# Patient Record
Sex: MALE | Race: WHITE | NOT HISPANIC OR LATINO | Employment: OTHER | URBAN - METROPOLITAN AREA
[De-identification: names, ages, dates, MRNs, and addresses within clinical notes are randomized per-mention and may not be internally consistent; named-entity substitution may affect disease eponyms.]

---

## 2021-04-15 ENCOUNTER — OFFICE VISIT (OUTPATIENT)
Dept: URGENT CARE | Facility: CLINIC | Age: 66
End: 2021-04-15
Payer: MEDICAID

## 2021-04-15 VITALS
BODY MASS INDEX: 28.75 KG/M2 | RESPIRATION RATE: 20 BRPM | DIASTOLIC BLOOD PRESSURE: 90 MMHG | HEIGHT: 74 IN | TEMPERATURE: 97 F | SYSTOLIC BLOOD PRESSURE: 150 MMHG | HEART RATE: 87 BPM | WEIGHT: 224 LBS | OXYGEN SATURATION: 95 %

## 2021-04-15 DIAGNOSIS — H61.23 BILATERAL IMPACTED CERUMEN: Primary | ICD-10-CM

## 2021-04-15 PROBLEM — A49.8: Status: ACTIVE | Noted: 2021-04-15

## 2021-04-15 PROBLEM — I50.22 CHRONIC SYSTOLIC HEART FAILURE: Status: ACTIVE | Noted: 2019-06-21

## 2021-04-15 PROBLEM — I21.4 NSTEMI (NON-ST ELEVATED MYOCARDIAL INFARCTION): Status: ACTIVE | Noted: 2019-05-05

## 2021-04-15 PROBLEM — I33.0 ACUTE BACTERIAL ENDOCARDITIS: Status: ACTIVE | Noted: 2021-04-15

## 2021-04-15 PROBLEM — R06.1 STRIDOR: Status: ACTIVE | Noted: 2017-08-03

## 2021-04-15 PROBLEM — R60.0 BILATERAL EDEMA OF LOWER EXTREMITY: Status: ACTIVE | Noted: 2019-05-05

## 2021-04-15 PROBLEM — D14.1: Status: ACTIVE | Noted: 2017-08-03

## 2021-04-15 PROBLEM — Z87.898 HX OF INTRAVENOUS DRUG USE IN REMISSION: Status: ACTIVE | Noted: 2021-04-15

## 2021-04-15 PROBLEM — B18.2 CHRONIC HEPATITIS C WITHOUT HEPATIC COMA: Status: ACTIVE | Noted: 2021-04-15

## 2021-04-15 PROBLEM — I34.0 SEVERE MITRAL REGURGITATION: Status: ACTIVE | Noted: 2019-05-23

## 2021-04-15 PROBLEM — F11.10 HEROIN ABUSE: Status: ACTIVE | Noted: 2017-08-10

## 2021-04-15 PROBLEM — L02.419 ABSCESS OF SHOULDER: Status: ACTIVE | Noted: 2019-05-05

## 2021-04-15 PROBLEM — J38.3 VOCAL CORD MASS: Status: ACTIVE | Noted: 2021-04-15

## 2021-04-15 PROBLEM — H35.711: Status: ACTIVE | Noted: 2019-11-05

## 2021-04-15 PROBLEM — I76 SEPTIC EMBOLISM: Status: ACTIVE | Noted: 2021-04-15

## 2021-04-15 PROBLEM — G06.0 BRAIN ABSCESS: Status: ACTIVE | Noted: 2021-04-15

## 2021-04-15 PROCEDURE — 99203 PR OFFICE/OUTPT VISIT, NEW, LEVL III, 30-44 MIN: ICD-10-PCS | Mod: S$GLB,,, | Performed by: PHYSICIAN ASSISTANT

## 2021-04-15 PROCEDURE — 99203 OFFICE O/P NEW LOW 30 MIN: CPT | Mod: S$GLB,,, | Performed by: PHYSICIAN ASSISTANT

## 2022-01-09 ENCOUNTER — HOSPITAL ENCOUNTER (OUTPATIENT)
Facility: HOSPITAL | Age: 67
Discharge: HOME | End: 2022-01-10
Attending: EMERGENCY MEDICINE | Admitting: INTERNAL MEDICINE
Payer: MEDICARE

## 2022-01-09 DIAGNOSIS — U07.1 COVID: ICD-10-CM

## 2022-01-09 DIAGNOSIS — R05.9 COUGH: ICD-10-CM

## 2022-01-09 DIAGNOSIS — U07.1 COVID-19: Primary | ICD-10-CM

## 2022-01-09 DIAGNOSIS — R07.9 CHEST PAIN: ICD-10-CM

## 2022-01-09 DIAGNOSIS — R06.02 SOB (SHORTNESS OF BREATH): ICD-10-CM

## 2022-01-09 LAB
ALBUMIN SERPL BCP-MCNC: 4.3 G/DL (ref 3.5–5.2)
ALP SERPL-CCNC: 62 U/L (ref 55–135)
ALT SERPL W/O P-5'-P-CCNC: 66 U/L (ref 10–44)
ANION GAP SERPL CALC-SCNC: 9 MMOL/L (ref 8–16)
APTT PPP: 34 SEC (ref 23.3–35.1)
AST SERPL-CCNC: 62 U/L (ref 10–40)
BASOPHILS # BLD AUTO: 0.02 K/UL (ref 0–0.2)
BASOPHILS NFR BLD: 0.5 % (ref 0–1.9)
BILIRUB SERPL-MCNC: 0.9 MG/DL (ref 0.1–1)
BNP SERPL-MCNC: 22 PG/ML (ref 0–99)
BUN SERPL-MCNC: 17 MG/DL (ref 8–23)
CALCIUM SERPL-MCNC: 8.6 MG/DL (ref 8.7–10.5)
CHLORIDE SERPL-SCNC: 96 MMOL/L (ref 95–110)
CK SERPL-CCNC: 245 U/L (ref 20–200)
CO2 SERPL-SCNC: 28 MMOL/L (ref 23–29)
CREAT SERPL-MCNC: 1.4 MG/DL (ref 0.5–1.4)
CRP SERPL-MCNC: 0.49 MG/DL
D DIMER PPP IA.FEU-MCNC: 0.72 UG/ML FEU
DIFFERENTIAL METHOD: ABNORMAL
EOSINOPHIL # BLD AUTO: 0 K/UL (ref 0–0.5)
EOSINOPHIL NFR BLD: 0 % (ref 0–8)
ERYTHROCYTE [DISTWIDTH] IN BLOOD BY AUTOMATED COUNT: 15.8 % (ref 11.5–14.5)
ERYTHROCYTE [SEDIMENTATION RATE] IN BLOOD BY WESTERGREN METHOD: 6 MM/HR (ref 0–10)
EST. GFR  (AFRICAN AMERICAN): >60 ML/MIN/1.73 M^2
EST. GFR  (NON AFRICAN AMERICAN): 52.4 ML/MIN/1.73 M^2
FERRITIN SERPL-MCNC: 164 NG/ML (ref 20–300)
GLUCOSE SERPL-MCNC: 96 MG/DL (ref 70–110)
HCT VFR BLD AUTO: 58.3 % (ref 40–54)
HGB BLD-MCNC: 18.4 G/DL (ref 14–18)
IMM GRANULOCYTES # BLD AUTO: 0.03 K/UL (ref 0–0.04)
IMM GRANULOCYTES NFR BLD AUTO: 0.7 % (ref 0–0.5)
INFLUENZA A, MOLECULAR: NEGATIVE
INFLUENZA B, MOLECULAR: NEGATIVE
INR PPP: 1.2
INR PPP: 1.3
LACTATE SERPL-SCNC: 1.6 MMOL/L (ref 0.5–1.9)
LDH SERPL L TO P-CCNC: 257 U/L (ref 110–260)
LYMPHOCYTES # BLD AUTO: 0.9 K/UL (ref 1–4.8)
LYMPHOCYTES NFR BLD: 21.2 % (ref 18–48)
MCH RBC QN AUTO: 26.7 PG (ref 27–31)
MCHC RBC AUTO-ENTMCNC: 31.6 G/DL (ref 32–36)
MCV RBC AUTO: 85 FL (ref 82–98)
MONOCYTES # BLD AUTO: 0.7 K/UL (ref 0.3–1)
MONOCYTES NFR BLD: 17.2 % (ref 4–15)
NEUTROPHILS # BLD AUTO: 2.5 K/UL (ref 1.8–7.7)
NEUTROPHILS NFR BLD: 60.4 % (ref 38–73)
NRBC BLD-RTO: 0 /100 WBC
PLATELET # BLD AUTO: 176 K/UL (ref 150–450)
PMV BLD AUTO: 10 FL (ref 9.2–12.9)
POTASSIUM SERPL-SCNC: 4 MMOL/L (ref 3.5–5.1)
PROT SERPL-MCNC: 8.5 G/DL (ref 6–8.4)
PROTHROMBIN TIME: 14 SEC (ref 11.4–13.7)
PROTHROMBIN TIME: 15.1 SEC (ref 11.4–13.7)
RBC # BLD AUTO: 6.88 M/UL (ref 4.6–6.2)
SARS-COV-2 RDRP RESP QL NAA+PROBE: POSITIVE
SODIUM SERPL-SCNC: 133 MMOL/L (ref 136–145)
SPECIMEN SOURCE: NORMAL
TROPONIN I SERPL DL<=0.01 NG/ML-MCNC: <0.03 NG/ML
WBC # BLD AUTO: 4.19 K/UL (ref 3.9–12.7)

## 2022-01-09 PROCEDURE — 25000003 PHARM REV CODE 250: Performed by: NURSE PRACTITIONER

## 2022-01-09 PROCEDURE — 36415 COLL VENOUS BLD VENIPUNCTURE: CPT | Performed by: EMERGENCY MEDICINE

## 2022-01-09 PROCEDURE — 93005 ELECTROCARDIOGRAM TRACING: CPT | Performed by: INTERNAL MEDICINE

## 2022-01-09 PROCEDURE — 82306 VITAMIN D 25 HYDROXY: CPT | Performed by: NURSE PRACTITIONER

## 2022-01-09 PROCEDURE — 63600175 PHARM REV CODE 636 W HCPCS: Performed by: NURSE PRACTITIONER

## 2022-01-09 PROCEDURE — 82728 ASSAY OF FERRITIN: CPT | Performed by: EMERGENCY MEDICINE

## 2022-01-09 PROCEDURE — 85651 RBC SED RATE NONAUTOMATED: CPT | Performed by: NURSE PRACTITIONER

## 2022-01-09 PROCEDURE — 83880 ASSAY OF NATRIURETIC PEPTIDE: CPT | Performed by: EMERGENCY MEDICINE

## 2022-01-09 PROCEDURE — U0002 COVID-19 LAB TEST NON-CDC: HCPCS | Performed by: NURSE PRACTITIONER

## 2022-01-09 PROCEDURE — 36415 COLL VENOUS BLD VENIPUNCTURE: CPT | Performed by: NURSE PRACTITIONER

## 2022-01-09 PROCEDURE — 93010 ELECTROCARDIOGRAM REPORT: CPT | Mod: ,,, | Performed by: INTERNAL MEDICINE

## 2022-01-09 PROCEDURE — 82550 ASSAY OF CK (CPK): CPT | Performed by: EMERGENCY MEDICINE

## 2022-01-09 PROCEDURE — 85379 FIBRIN DEGRADATION QUANT: CPT | Performed by: EMERGENCY MEDICINE

## 2022-01-09 PROCEDURE — 87502 INFLUENZA DNA AMP PROBE: CPT | Performed by: INTERNAL MEDICINE

## 2022-01-09 PROCEDURE — 93010 EKG 12-LEAD: ICD-10-PCS | Mod: ,,, | Performed by: INTERNAL MEDICINE

## 2022-01-09 PROCEDURE — 83605 ASSAY OF LACTIC ACID: CPT | Performed by: NURSE PRACTITIONER

## 2022-01-09 PROCEDURE — 84484 ASSAY OF TROPONIN QUANT: CPT | Performed by: NURSE PRACTITIONER

## 2022-01-09 PROCEDURE — 84484 ASSAY OF TROPONIN QUANT: CPT | Mod: 91 | Performed by: EMERGENCY MEDICINE

## 2022-01-09 PROCEDURE — G0378 HOSPITAL OBSERVATION PER HR: HCPCS

## 2022-01-09 PROCEDURE — 85610 PROTHROMBIN TIME: CPT | Performed by: NURSE PRACTITIONER

## 2022-01-09 PROCEDURE — 85025 COMPLETE CBC W/AUTO DIFF WBC: CPT | Performed by: EMERGENCY MEDICINE

## 2022-01-09 PROCEDURE — 96372 THER/PROPH/DIAG INJ SC/IM: CPT | Mod: 59

## 2022-01-09 PROCEDURE — 85610 PROTHROMBIN TIME: CPT | Mod: 91 | Performed by: EMERGENCY MEDICINE

## 2022-01-09 PROCEDURE — 85730 THROMBOPLASTIN TIME PARTIAL: CPT | Performed by: NURSE PRACTITIONER

## 2022-01-09 PROCEDURE — 80053 COMPREHEN METABOLIC PANEL: CPT | Performed by: EMERGENCY MEDICINE

## 2022-01-09 PROCEDURE — 87040 BLOOD CULTURE FOR BACTERIA: CPT | Mod: 59 | Performed by: NURSE PRACTITIONER

## 2022-01-09 PROCEDURE — 86140 C-REACTIVE PROTEIN: CPT | Performed by: EMERGENCY MEDICINE

## 2022-01-09 PROCEDURE — 25500020 PHARM REV CODE 255: Performed by: NURSE PRACTITIONER

## 2022-01-09 PROCEDURE — 99285 EMERGENCY DEPT VISIT HI MDM: CPT | Mod: 25

## 2022-01-09 PROCEDURE — 83615 LACTATE (LD) (LDH) ENZYME: CPT | Performed by: EMERGENCY MEDICINE

## 2022-01-09 PROCEDURE — 96360 HYDRATION IV INFUSION INIT: CPT

## 2022-01-09 PROCEDURE — 96361 HYDRATE IV INFUSION ADD-ON: CPT

## 2022-01-09 RX ORDER — DEXAMETHASONE SODIUM PHOSPHATE 4 MG/ML
8 INJECTION, SOLUTION INTRA-ARTICULAR; INTRALESIONAL; INTRAMUSCULAR; INTRAVENOUS; SOFT TISSUE
Status: COMPLETED | OUTPATIENT
Start: 2022-01-09 | End: 2022-01-09

## 2022-01-09 RX ORDER — ONDANSETRON 2 MG/ML
4 INJECTION INTRAMUSCULAR; INTRAVENOUS EVERY 8 HOURS PRN
Status: DISCONTINUED | OUTPATIENT
Start: 2022-01-09 | End: 2022-01-10 | Stop reason: HOSPADM

## 2022-01-09 RX ORDER — SODIUM CHLORIDE 0.9 % (FLUSH) 0.9 %
10 SYRINGE (ML) INJECTION
Status: DISCONTINUED | OUTPATIENT
Start: 2022-01-09 | End: 2022-01-10 | Stop reason: HOSPADM

## 2022-01-09 RX ORDER — IBUPROFEN 200 MG
24 TABLET ORAL
Status: DISCONTINUED | OUTPATIENT
Start: 2022-01-09 | End: 2022-01-10 | Stop reason: HOSPADM

## 2022-01-09 RX ORDER — ACETAMINOPHEN 325 MG/1
650 TABLET ORAL EVERY 8 HOURS PRN
Status: DISCONTINUED | OUTPATIENT
Start: 2022-01-09 | End: 2022-01-10 | Stop reason: HOSPADM

## 2022-01-09 RX ORDER — ACETAMINOPHEN 500 MG
1000 TABLET ORAL EVERY 6 HOURS PRN
COMMUNITY

## 2022-01-09 RX ORDER — ALBUTEROL SULFATE 90 UG/1
2 AEROSOL, METERED RESPIRATORY (INHALATION) EVERY 8 HOURS
Status: DISCONTINUED | OUTPATIENT
Start: 2022-01-10 | End: 2022-01-09

## 2022-01-09 RX ORDER — LANOLIN ALCOHOL/MO/W.PET/CERES
800 CREAM (GRAM) TOPICAL
Status: DISCONTINUED | OUTPATIENT
Start: 2022-01-09 | End: 2022-01-10 | Stop reason: HOSPADM

## 2022-01-09 RX ORDER — ENOXAPARIN SODIUM 100 MG/ML
1 INJECTION SUBCUTANEOUS 2 TIMES DAILY
Status: DISCONTINUED | OUTPATIENT
Start: 2022-01-09 | End: 2022-01-10 | Stop reason: HOSPADM

## 2022-01-09 RX ORDER — GUAIFENESIN/DEXTROMETHORPHAN 100-10MG/5
10 SYRUP ORAL EVERY 4 HOURS PRN
Status: DISCONTINUED | OUTPATIENT
Start: 2022-01-09 | End: 2022-01-10 | Stop reason: HOSPADM

## 2022-01-09 RX ORDER — HYDROCODONE BITARTRATE AND ACETAMINOPHEN 5; 325 MG/1; MG/1
1 TABLET ORAL EVERY 6 HOURS PRN
Status: DISCONTINUED | OUTPATIENT
Start: 2022-01-09 | End: 2022-01-10 | Stop reason: HOSPADM

## 2022-01-09 RX ORDER — ASCORBIC ACID 500 MG
500 TABLET ORAL 2 TIMES DAILY
Status: DISCONTINUED | OUTPATIENT
Start: 2022-01-09 | End: 2022-01-10 | Stop reason: HOSPADM

## 2022-01-09 RX ORDER — GLUCAGON 1 MG
1 KIT INJECTION
Status: DISCONTINUED | OUTPATIENT
Start: 2022-01-09 | End: 2022-01-10 | Stop reason: HOSPADM

## 2022-01-09 RX ORDER — ALBUTEROL SULFATE 90 UG/1
2 AEROSOL, METERED RESPIRATORY (INHALATION) EVERY 6 HOURS
Status: DISCONTINUED | OUTPATIENT
Start: 2022-01-10 | End: 2022-01-10 | Stop reason: HOSPADM

## 2022-01-09 RX ORDER — IBUPROFEN 200 MG
16 TABLET ORAL
Status: DISCONTINUED | OUTPATIENT
Start: 2022-01-09 | End: 2022-01-10 | Stop reason: HOSPADM

## 2022-01-09 RX ORDER — TALC
6 POWDER (GRAM) TOPICAL NIGHTLY PRN
Status: DISCONTINUED | OUTPATIENT
Start: 2022-01-09 | End: 2022-01-10 | Stop reason: HOSPADM

## 2022-01-09 RX ADMIN — DEXAMETHASONE SODIUM PHOSPHATE 8 MG: 4 INJECTION, SOLUTION INTRA-ARTICULAR; INTRALESIONAL; INTRAMUSCULAR; INTRAVENOUS; SOFT TISSUE at 06:01

## 2022-01-09 RX ADMIN — IOHEXOL 100 ML: 350 INJECTION, SOLUTION INTRAVENOUS at 08:01

## 2022-01-09 RX ADMIN — OXYCODONE HYDROCHLORIDE AND ACETAMINOPHEN 500 MG: 500 TABLET ORAL at 10:01

## 2022-01-09 RX ADMIN — SODIUM CHLORIDE 1000 ML: 0.9 INJECTION, SOLUTION INTRAVENOUS at 06:01

## 2022-01-09 RX ADMIN — ENOXAPARIN SODIUM 100 MG: 100 INJECTION SUBCUTANEOUS at 10:01

## 2022-01-10 VITALS
OXYGEN SATURATION: 93 % | HEIGHT: 74 IN | BODY MASS INDEX: 28.86 KG/M2 | HEART RATE: 85 BPM | TEMPERATURE: 98 F | DIASTOLIC BLOOD PRESSURE: 74 MMHG | SYSTOLIC BLOOD PRESSURE: 132 MMHG | WEIGHT: 224.88 LBS | RESPIRATION RATE: 18 BRPM

## 2022-01-10 LAB
25(OH)D3+25(OH)D2 SERPL-MCNC: 21 NG/ML (ref 30–96)
ALBUMIN SERPL BCP-MCNC: 3.3 G/DL (ref 3.5–5.2)
ALP SERPL-CCNC: 49 U/L (ref 55–135)
ALT SERPL W/O P-5'-P-CCNC: 63 U/L (ref 10–44)
ANION GAP SERPL CALC-SCNC: 11 MMOL/L (ref 8–16)
AST SERPL-CCNC: 59 U/L (ref 10–40)
BILIRUB SERPL-MCNC: 0.9 MG/DL (ref 0.1–1)
BUN SERPL-MCNC: 15 MG/DL (ref 8–23)
CALCIUM SERPL-MCNC: 7.4 MG/DL (ref 8.7–10.5)
CHLORIDE SERPL-SCNC: 99 MMOL/L (ref 95–110)
CO2 SERPL-SCNC: 23 MMOL/L (ref 23–29)
CREAT SERPL-MCNC: 1.2 MG/DL (ref 0.5–1.4)
CRP SERPL-MCNC: 0.28 MG/DL
EST. GFR  (AFRICAN AMERICAN): >60 ML/MIN/1.73 M^2
EST. GFR  (NON AFRICAN AMERICAN): >60 ML/MIN/1.73 M^2
FERRITIN SERPL-MCNC: 170 NG/ML (ref 20–300)
GLUCOSE SERPL-MCNC: 126 MG/DL (ref 70–110)
MAGNESIUM SERPL-MCNC: 1.7 MG/DL (ref 1.6–2.6)
PHOSPHATE SERPL-MCNC: 2.9 MG/DL (ref 2.7–4.5)
POTASSIUM SERPL-SCNC: 4 MMOL/L (ref 3.5–5.1)
PROT SERPL-MCNC: 7 G/DL (ref 6–8.4)
SODIUM SERPL-SCNC: 133 MMOL/L (ref 136–145)
TROPONIN I SERPL DL<=0.01 NG/ML-MCNC: <0.03 NG/ML

## 2022-01-10 PROCEDURE — 99900031 HC PATIENT EDUCATION (STAT)

## 2022-01-10 PROCEDURE — 94640 AIRWAY INHALATION TREATMENT: CPT

## 2022-01-10 PROCEDURE — 25000003 PHARM REV CODE 250: Performed by: NURSE PRACTITIONER

## 2022-01-10 PROCEDURE — 86140 C-REACTIVE PROTEIN: CPT | Performed by: NURSE PRACTITIONER

## 2022-01-10 PROCEDURE — 96372 THER/PROPH/DIAG INJ SC/IM: CPT | Mod: 59

## 2022-01-10 PROCEDURE — 82728 ASSAY OF FERRITIN: CPT | Performed by: NURSE PRACTITIONER

## 2022-01-10 PROCEDURE — 83735 ASSAY OF MAGNESIUM: CPT | Performed by: NURSE PRACTITIONER

## 2022-01-10 PROCEDURE — 94761 N-INVAS EAR/PLS OXIMETRY MLT: CPT

## 2022-01-10 PROCEDURE — 84484 ASSAY OF TROPONIN QUANT: CPT | Performed by: NURSE PRACTITIONER

## 2022-01-10 PROCEDURE — G0378 HOSPITAL OBSERVATION PER HR: HCPCS

## 2022-01-10 PROCEDURE — 84100 ASSAY OF PHOSPHORUS: CPT | Performed by: NURSE PRACTITIONER

## 2022-01-10 PROCEDURE — 80053 COMPREHEN METABOLIC PANEL: CPT | Performed by: NURSE PRACTITIONER

## 2022-01-10 PROCEDURE — 63600175 PHARM REV CODE 636 W HCPCS: Performed by: NURSE PRACTITIONER

## 2022-01-10 PROCEDURE — 25000242 PHARM REV CODE 250 ALT 637 W/ HCPCS: Performed by: NURSE PRACTITIONER

## 2022-01-10 RX ADMIN — ALBUTEROL SULFATE 2 PUFF: 90 AEROSOL, METERED RESPIRATORY (INHALATION) at 08:01

## 2022-01-10 RX ADMIN — ALBUTEROL SULFATE 2 PUFF: 90 AEROSOL, METERED RESPIRATORY (INHALATION) at 01:01

## 2022-01-10 RX ADMIN — OXYCODONE HYDROCHLORIDE AND ACETAMINOPHEN 500 MG: 500 TABLET ORAL at 09:01

## 2022-01-10 RX ADMIN — ENOXAPARIN SODIUM 100 MG: 100 INJECTION SUBCUTANEOUS at 09:01

## 2022-01-10 RX ADMIN — THERA TABS 1 TABLET: TAB at 09:01

## 2022-01-10 NOTE — PLAN OF CARE
"Called the hospital   (dialed "0") and requested to be connected with 's office.  Spoke with Florinda at Dr. 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  Florinda did confirm of access to patient record in Epic.  Provided her with name of this patient and MRN [id number].  Florinda informed me that this information will be given to Dr. Pinto and someone from their office will follow up with phone call to patient.  Next called [phone] and spoke with Krishna Tierney regarding referral called to Dr. Pinto's office and for patient to expect phone call from them.  "

## 2022-01-10 NOTE — H&P
[facility] History & Physical Examination   Patient Name: Kraig Tierney  MRN: [id number]  Patient Class: OP- Observation   Admission Date: 1/9/2022  4:00 PM  Length of Stay: 0  Attending Physician:   Primary Care Provider: Primary Doctor No  Face-to-Face encounter date: 01/09/2022  Code Status: Full Code  MPOA:  Chief Complaint: COVID-19 Concerns, Cough, Shortness of Breath (Diagnosed with covid today at urgent care had xray told has pneumonia to come to er), and Chest Pain (Also states chest pain lower ribs both sides from coughing)        Patient information was obtained from patient, past medical records and ER records.   HISTORY OF PRESENT ILLNESS:   Kraig Tierney is a 66 y.o. old male who  has a past medical history of AV block, 1st degree, Hepatitis B, Hepatitis C virus infection without hepatic coma, History of intravenous drug use in remission, Hyperlipidemia, Hypertension, Mitral valve replaced (05/27/2019), NSTEMI (non-ST elevated myocardial infarction) (05/05/2019), and Tobacco abuse.. The patient presented to [facility] on 1/9/2022 with a primary complaint of COVID-19 Concerns, Cough, Shortness of Breath (Diagnosed with covid today at urgent care had xray told has pneumonia to come to er), and Chest Pain (Also states chest pain lower ribs both sides from coughing)  .     66-year-old male presents to emergency room with complaints of cough fever and chest congestion.  The patient was seen by Dr. Tierney who was apparently with his relative and was told he had pneumonia and COVID-19.  The patient was states he sent to our facility for admission    A chest x-ray and a CT of the chest did not really reveal any active pneumonia therefore the patient does not qualify for remdesivir or steroids.  However he does have a lot of risk factors and he is morbidly obese.  Also he was hypoxic on admit when he was ambulated his oxygen saturations dropped  to 89-90%    We will admit him for bronchitis/COVID and if he stable he may be discharged in the morning and may qualify for outpatient monoclonal antibody therapy  REVIEW OF SYSTEMS:   10 Point Review of System was performed and was found to be negative except for that mentioned already in the HPI and   Review of Systems (Negative unless checked off)  Review of Systems   Constitutional: Positive for chills and fever.   HENT: Positive for sore throat.    Eyes: Negative.    Respiratory: Positive for cough and shortness of breath.    Cardiovascular: Negative.    Gastrointestinal: Positive for constipation.   Genitourinary: Negative.    Musculoskeletal: Positive for myalgias.   Skin: Negative.    Neurological: Positive for weakness.   Endo/Heme/Allergies: Negative.    Psychiatric/Behavioral: Positive for substance abuse. The patient is nervous/anxious.            PAST MEDICAL HISTORY:     Past Medical History:   Diagnosis Date    AV block, 1st degree     Hepatitis B     Hepatitis C virus infection without hepatic coma     History of intravenous drug use in remission     Hyperlipidemia     Hypertension     Mitral valve replaced 05/27/2019    NSTEMI (non-ST elevated myocardial infarction) 05/05/2019    Tobacco abuse        PAST SURGICAL HISTORY:     Past Surgical History:   Procedure Laterality Date    ANGIOPLASTY      CARDIAC VALVE REPLACEMENT      CARDIAC VALVE REPLACEMENT      does not remember what valve       ALLERGIES:   Olanzapine and Quetiapine    FAMILY HISTORY:   History reviewed. No pertinent family history.    SOCIAL HISTORY:     Social History     Tobacco Use    Smoking status: Current Every Day Smoker     Packs/day: 2.00     Types: Cigarettes    Smokeless tobacco: Never Used   Substance Use Topics    Alcohol use: Not Currently        Social History     Substance and Sexual Activity   Sexual Activity Not on file        HOME MEDICATIONS:     Prior to Admission medications    Medication Sig  "Start Date End Date Taking? Authorizing Provider   acetaminophen (TYLENOL EXTRA STRENGTH) 500 MG tablet Take 1,000 mg by mouth every 6 (six) hours as needed for Pain.   Yes Historical Provider   diphenhyd/phenyleph/acetaminop (COLD AND FLU RELIEF,DIPHEN-PE, ORAL) Take 2 capsules by mouth as needed.   Yes Historical Provider         PHYSICAL EXAM:   BP (!) 144/76   Pulse 95   Temp 98.5 °F (36.9 °C) (Oral)   Resp 20   Ht 6' 2" (1.88 m)   Wt 104.3 kg (230 lb)   SpO2 96%   BMI 29.53 kg/m²   Vitals Reviewed  General appearance:  Ill-appearing male in no apparent distress.  Skin: No Rash.   Neuro: Motor and sensory exams grossly intact. Good tone. Power in all 4 extremities 5/5.   HENT: Atraumatic head. Moist mucous membranes of oral cavity.  Eyes: Normal extraocular movements.   Neck: Supple. No evidence of lymphadenopathy. No thyroidomegaly.  Lungs:  Faint rhonchi bilaterally. No wheezing present.   Heart: Regular rate and rhythm. S1 and S2 present with positive murmurs/gallop/rub.  Positive edema. No JVD present.   Abdomen: Soft, non-distended, non-tender. No rebound tenderness/guarding. No masses or organomegaly. Bowel sounds are normal. Bladder is not palpable.   Extremities: No cyanosis, clubbing, pots edema.  Psych/mental status: Alert and oriented. Cooperative. Responds appropriately to questions.   EMERGENCY DEPARTMENT LABS AND IMAGING:   Following labs were Reviewed   Recent Labs   Lab 01/09/22  1624   WBC 4.19   HGB 18.4*   HCT 58.3*      CALCIUM 8.6*   ALBUMIN 4.3   PROT 8.5*   *   K 4.0   CO2 28   CL 96   BUN 17   CREATININE 1.4   ALKPHOS 62   ALT 66*   AST 62*   BILITOT 0.9         BMP:   Recent Labs   Lab 01/09/22  1624   GLU 96   *   K 4.0   CL 96   CO2 28   BUN 17   CREATININE 1.4   CALCIUM 8.6*   , CMP   Recent Labs   Lab 01/09/22  1624   *   K 4.0   CL 96   CO2 28   GLU 96   BUN 17   CREATININE 1.4   CALCIUM 8.6*   PROT 8.5*   ALBUMIN 4.3   BILITOT 0.9   ALKPHOS 62   AST " 62*   ALT 66*   ANIONGAP 9   ESTGFRAFRICA >60.0   EGFRNONAA 52.4*   , CBC   Recent Labs   Lab 01/09/22  1624   WBC 4.19   HGB 18.4*   HCT 58.3*      , INR   Lab Results   Component Value Date    INR 1.2 01/09/2022   , Lipid Panel No results found for: CHOL, HDL, LDLCALC, TRIG, CHOLHDL, Troponin   Recent Labs   Lab 01/09/22  1624 01/09/22  1857   TROPONINI <0.030 <0.030   , A1C: No results for input(s): HGBA1C in the last 4320 hours. and All labs within the past 24 hours have been reviewed  Microbiology Results (last 7 days)     Procedure Component Value Units Date/Time    Influenza A & B by Molecular [[id number]]     Order Status: No result Specimen: Nasopharyngeal Swab     Blood Culture #2 **CANNOT BE ORDERED STAT** [[id number]] Collected: 01/09/22 1821    Order Status: Sent Specimen: Blood from Peripheral, Antecubital, Left Updated: 01/09/22 1843    Blood Culture #1 **CANNOT BE ORDERED STAT** [[id number]] Collected: 01/09/22 1624    Order Status: Sent Specimen: Blood from Peripheral, Antecubital, Right Updated: 01/09/22 1651        CTA Chest Non-Coronary (PE Study)   Final Result      X-Ray Chest PA And Lateral   Final Result      X-Ray Chest PA And Lateral    Result Date: 1/9/2022  Reason: Shortness of breath. FINDINGS: PA and lateral chest with comparison chest x-ray September 6, 2014 show normal cardiomediastinal silhouette. Median sternotomy wires noted. Lungs are clear. Pulmonary vasculature is normal. No acute osseous abnormality. IMPRESSION: No acute cardiopulmonary abnormality. Electronically signed by:  Roderick Groves DO  1/9/2022 4:49 PM CST Workstation: [id number]    CTA Chest Non-Coronary (PE Study)    Result Date: 1/9/2022  CTA THORAX - PULMONARY ARTERIES HISTORY: Suspected pulmonary embolus. Shortness of breath. Chest pain. COVID 1 positive. COMPARISON:  None. TECHNIQUE:  CT angiogram of the chest using low osmolar contrast.  Coronal and sagittal reformations including 3D maximum intensity  projections. This exam was performed according to our departmental dose-optimization program, which includes automated exposure control, adjustment of the mA and/or kV according to patient size and/or use of iterative reconstruction technique. FINDINGS: PULMONARY ARTERIAL SYSTEM: Contrast bolus is adequate.  No CT evidence for pulmonary embolism. 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 CARDIAC: Normal. Artificial mitral valve. AORTA/VASCULAR: No aneurysm or dissection. LYMPH NODES/MEDIASTINUM: No thoracic adenopathy. CENTRAL AIRWAYS: Central airways are patent. There is diffuse airway wall thickening. LUNGS: No pulmonary infiltrates or masses.  Mild paraseptal and centrilobular emphysema PLEURA: Normal. ESOPHAGUS: Collapsed and not well assessed by CT, without obvious abnormality. THYROID:  Negative, where seen. CHEST WALL:  Normal. UPPER ABDOMEN:  Normal. THORACIC SKELETAL: No acute finding. ADDITIONAL CHEST FINDINGS: None. IMPRESSION:   No pulmonary embolus or aortic dissection.   Diffuse airway wall thickening consistent with bronchitis. No consolidation.   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 Electronically signed by:  Mark Bassett MD  1/9/2022 9:47 PM CST Workstation: [id number]    ASSESSMENT & PLAN:   Kraig Tierney is a 66 y.o. male admitted for    1. Covid 19 with hypoxia no pneumonia/Bronchitis  - albuterol metered-dose inhaler  -vitamins C and D  -antitussive  -Tylenol for fever  -does not qualify for steroids or Remdesivir  -supplemental O2 as needed  -monitor closely patient does have high risk for poor outcome  -repeat COVID-19 test    2.  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   ______________________________________________________________  Face-to-Face encounter date: 01/09/2022  Encounter included review of the medical records, interviewing and examining the patient face-to-face, discussion with family and other health care providers including emergency medicine physician, admission orders, interpreting lab/test results and formulating a plan of care.   Medical Decision Making during this encounter was  [_] Low Complexity  [_] Moderate Complexity  [x] High Complexity  _________________________________________________________________________________    INPATIENT LIST OF MEDICATIONS     Current Facility-Administered Medications:     acetaminophen tablet 650 mg, 650 mg, Oral, Q8H PRN, Gina Bui NP    [START ON 1/10/2022] albuterol inhaler 2 puff, 2 puff, Inhalation, Q8H **AND** [COMPLETED] MDI Once, , , Once, Wendie Duarte NP    [START ON 1/10/2022] albuterol inhaler 2 puff, 2 puff, Inhalation, Q6H **AND** [START ON 1/10/2022] MDI Q6H, , , Q6H, Gina Bui NP    ascorbic acid (vitamin C) tablet 500 mg, 500 mg, Oral, BID, Gina Bui NP    dextromethorphan-guaiFENesin  mg/5 ml liquid 10 mL, 10 mL, Oral, Q4H PRN, Gina Bui NP    dextrose 50% injection 12.5 g, 12.5 g, Intravenous, PRN, Gina Bui, NP    dextrose 50% injection 25 g, 25 g, Intravenous, PRN, Gina Bui, NP    enoxaparin injection 100 mg, 1 mg/kg, Subcutaneous, BID, Gina Bui, NP    glucagon (human recombinant) injection 1 mg, 1 mg, Intramuscular, PRN, Gina Bui, NP    glucose chewable tablet 16 g, 16 g, Oral, PRN, Gina Bui, NP    glucose chewable tablet 24 g, 24 g, Oral, PRN, Gina Bui,  NP    HYDROcodone-acetaminophen 5-325 mg per tablet 1 tablet, 1 tablet, Oral, Q6H PRN, Gina Bui NP    magnesium oxide tablet 800 mg, 800 mg, Oral, PRN, Gina Bui NP    melatonin tablet 6 mg, 6 mg, Oral, Nightly PRN, Gina Bui NP    [START ON 1/10/2022] multivitamin tablet, 1 tablet, Oral, Daily, Gina Bui NP    ondansetron injection 4 mg, 4 mg, Intravenous, Q8H PRN, Gina Bui NP    potassium bicarbonate disintegrating tablet 50 mEq, 50 mEq, Oral, PRN, Gina Bui NP    sodium chloride 0.9% flush 10 mL, 10 mL, Intravenous, PRN, Gina Bui NP    Current Outpatient Medications:     acetaminophen (TYLENOL EXTRA STRENGTH) 500 MG tablet, Take 1,000 mg by mouth every 6 (six) hours as needed for Pain., Disp: , Rfl:     diphenhyd/phenyleph/acetaminop (COLD AND FLU RELIEF,DIPHEN-PE, ORAL), Take 2 capsules by mouth as needed., Disp: , Rfl:       Scheduled Meds:   [START ON 1/10/2022] albuterol  2 puff Inhalation Q8H    [START ON 1/10/2022] albuterol  2 puff Inhalation Q6H    ascorbic acid (vitamin C)  500 mg Oral BID    enoxaparin  1 mg/kg Subcutaneous BID    [START ON 1/10/2022] multivitamin  1 tablet Oral Daily     Continuous Infusions:  PRN Meds:.acetaminophen, dextromethorphan-guaiFENesin  mg/5 ml, dextrose 50%, dextrose 50%, glucagon (human recombinant), glucose, glucose, HYDROcodone-acetaminophen, magnesium oxide, melatonin, ondansetron, potassium bicarbonate, sodium chloride 0.9%      Gina Bui  [facility] Hospitalist NP  01/09/2022

## 2022-01-10 NOTE — NURSING
Discharge paperwork reviewed with and given to patient. IV removed, catheter intact. Patient discharged in stable condition to personal vehicle.

## 2022-01-10 NOTE — ED PROVIDER NOTES
Encounter Date: 1/9/2022       History     Chief Complaint   Patient presents with    COVID-19 Concerns    Cough    Shortness of Breath     Diagnosed with covid today at urgent care had xray told has pneumonia to come to er    Chest Pain     Also states chest pain lower ribs both sides from coughing     Presents from Urgent care  Reports testing + for Covid  Pt also has note from his doctor that the x-ray there resulted as bilateral pneumonia  He is SOB  HA and subj fever        Review of patient's allergies indicates:   Allergen Reactions    Olanzapine Other (See Comments)     Agitation, worsening delirum    Quetiapine Other (See Comments)     Makes pt pass out     Past Medical History:   Diagnosis Date    AV block, 1st degree     Hepatitis B     Hepatitis C virus infection without hepatic coma     History of intravenous drug use in remission     Hyperlipidemia     Hypertension     Mitral valve replaced 05/27/2019    NSTEMI (non-ST elevated myocardial infarction) 05/05/2019    Tobacco abuse      Past Surgical History:   Procedure Laterality Date    ANGIOPLASTY      CARDIAC VALVE REPLACEMENT      CARDIAC VALVE REPLACEMENT      does not remember what valve     History reviewed. No pertinent family history.  Social History     Tobacco Use    Smoking status: Current Every Day Smoker     Packs/day: 2.00     Types: Cigarettes    Smokeless tobacco: Never Used   Substance Use Topics    Alcohol use: Not Currently    Drug use: Not Currently     Types: Heroin     Review of Systems   Constitutional: Positive for fever.   HENT: Negative for congestion, sore throat and trouble swallowing.    Respiratory: Positive for cough and shortness of breath. Negative for wheezing.    Cardiovascular: Negative for chest pain, palpitations and leg swelling.   Gastrointestinal: Negative for abdominal pain, diarrhea, nausea and vomiting.   Musculoskeletal: Negative for back pain.   Skin: Negative for rash.   Neurological:  Positive for headaches. Negative for dizziness and weakness.       Physical Exam     Initial Vitals [01/09/22 1557]   BP Pulse Resp Temp SpO2   (!) 153/112 101 20 98.5 °F (36.9 °C) 95 %      MAP       --         Physical Exam    Constitutional: He appears well-developed and well-nourished.   HENT:   Mouth/Throat: Oropharynx is clear and moist.   Eyes: Conjunctivae are normal.   Neck: Neck supple.   Normal range of motion.  Cardiovascular: Normal rate, regular rhythm and normal heart sounds.   Pulmonary/Chest: Breath sounds normal. He has no wheezes. He has no rhonchi.   Pt is SOB at rest    Abdominal: Abdomen is soft. Bowel sounds are normal. He exhibits no distension.   Musculoskeletal:         General: Normal range of motion.      Cervical back: Normal range of motion and neck supple.     Neurological: He is alert and oriented to person, place, and time. No sensory deficit. GCS score is 15. GCS eye subscore is 4. GCS verbal subscore is 5. GCS motor subscore is 6.   Skin: Skin is warm. Capillary refill takes less than 2 seconds.   Psychiatric: He has a normal mood and affect. Thought content normal.         ED Course   Procedures  Labs Reviewed   CBC W/ AUTO DIFFERENTIAL - Abnormal; Notable for the following components:       Result Value    RBC 6.88 (*)     Hemoglobin 18.4 (*)     Hematocrit 58.3 (*)     MCH 26.7 (*)     MCHC 31.6 (*)     RDW 15.8 (*)     Immature Granulocytes 0.7 (*)     Lymph # 0.9 (*)     Mono % 17.2 (*)     All other components within normal limits   COMPREHENSIVE METABOLIC PANEL - Abnormal; Notable for the following components:    Sodium 133 (*)     Calcium 8.6 (*)     Total Protein 8.5 (*)     AST 62 (*)     ALT 66 (*)     eGFR if non  52.4 (*)     All other components within normal limits   PROTIME-INR - Abnormal; Notable for the following components:    PT 14.0 (*)     All other components within normal limits   CK - Abnormal; Notable for the following components:    CPK  245 (*)     All other components within normal limits   D DIMER, QUANTITATIVE - Abnormal; Notable for the following components:    D-Dimer 0.72 (*)     All other components within normal limits    Narrative:       DDimer critical result(s) called and verbal readback obtained from   Levar Miranda RN Ed by MP4 01/09/2022 20:28   CULTURE, BLOOD   CULTURE, BLOOD   TROPONIN I   B-TYPE NATRIURETIC PEPTIDE   LACTIC ACID, PLASMA   C-REACTIVE PROTEIN   CK   FERRITIN   LACTATE DEHYDROGENASE   C-REACTIVE PROTEIN   LACTATE DEHYDROGENASE   FERRITIN   TROPONIN I   D DIMER, QUANTITATIVE   FERRITIN   SEDIMENTATION RATE   SEDIMENTATION RATE   SARS-COV-2 RNA AMPLIFICATION, QUAL        ECG Results          EKG 12-lead (Final result)  Result time 01/09/22 19:57:42    Final result by Interface, Lab In [facility] (01/09/22 19:57:42)                 Narrative:    Test Reason : R07.9,    Vent. Rate : 100 BPM     Atrial Rate : 100 BPM     P-R Int : 178 ms          QRS Dur : 102 ms      QT Int : 356 ms       P-R-T Axes : 052 -89 047 degrees     QTc Int : 459 ms    Sinus rhythm with occasional Premature ventricular complexes  Possible Left atrial enlargement  Left axis deviation  Incomplete right bundle branch block  Inferior infarct ,age undetermined  Abnormal ECG  No previous ECGs available  Confirmed by Gregory Newby MD ([id number]) on 1/9/2022 7:57:34 PM    Referred By: [email or web address]   SELF           Confirmed By:Gregory Newby MD                            Imaging Results          CTA Chest Non-Coronary (PE Study) (Final result)  Result time 01/09/22 21:47:17    Final result by Mark Bassett MD (01/09/22 21:47:17)                 Narrative:    CTA THORAX - PULMONARY ARTERIES    HISTORY: Suspected pulmonary embolus. Shortness of breath. Chest pain. COVID 1 positive.    COMPARISON:  None.    TECHNIQUE:  CT angiogram of the chest using low osmolar contrast.  Coronal and sagittal reformations including 3D maximum intensity projections.    This exam  was performed according to our departmental dose-optimization program, which includes automated exposure control, adjustment of the mA and/or kV according to patient size and/or use of iterative reconstruction technique.    FINDINGS:    PULMONARY ARTERIAL SYSTEM: Contrast bolus is adequate.  No CT evidence for pulmonary embolism. 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    CARDIAC: Normal. Artificial mitral valve.  AORTA/VASCULAR: No aneurysm or dissection.  LYMPH NODES/MEDIASTINUM: No thoracic adenopathy.  CENTRAL AIRWAYS: Central airways are patent. There is diffuse airway wall thickening.  LUNGS: No pulmonary infiltrates or masses.  Mild paraseptal and centrilobular emphysema  PLEURA: Normal.  ESOPHAGUS: Collapsed and not well assessed by CT, without obvious abnormality.  THYROID:  Negative, where seen.  CHEST WALL:  Normal.  UPPER ABDOMEN:  Normal.  THORACIC SKELETAL: No acute finding.  ADDITIONAL CHEST FINDINGS: None.    IMPRESSION:    No pulmonary embolus or aortic dissection.    Diffuse airway wall thickening consistent with bronchitis. No consolidation.    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    Electronically signed by:  Mark Bassett MD  1/9/2022 9:47 PM CST Workstation: [id number]                             X-Ray Chest PA And Lateral (Final result)  Result time 01/09/22 16:49:48    Final result by Roderick Groves MD (01/09/22 16:49:48)                 Narrative:    Reason: Shortness of breath.    FINDINGS:    PA and lateral chest with comparison chest x-ray September 6, 2014 show normal cardiomediastinal silhouette. Median sternotomy wires noted.  Lungs are clear. Pulmonary vasculature is normal. No acute osseous abnormality.    IMPRESSION:    No acute cardiopulmonary abnormality.    Electronically signed by:  Roderick Groves DO  1/9/2022 4:49 PM CST  Workstation: [id number]                               Medications   albuterol inhaler 2 puff (has no administration in time range)   sodium chloride 0.9% bolus 1,000 mL (0 mLs Intravenous Stopped 1/9/22 2016)   dexamethasone injection 8 mg (8 mg Intramuscular Given 1/9/22 1833)   iohexoL (OMNIPAQUE 350) injection 100 mL (100 mLs Intravenous Given 1/9/22 2043)     Medical Decision Making:   Initial Assessment:   Presents from Urgent care tested + for COVID Had chest x-ray there  Dx with pneumonia  Pt is SOB at rest  O2 sat 93% here  He reports subjective fever and is a 2 pkg of cigs smoker daily    ED Management:  Pt is Covid + from Urgent care  His chest x-ray here is neg.  NA+ 133   D Dimer 0.72  HE was ambulated and his O2 Sat dropped to 88-91%  Placed on O2 NC and now is 96%   Have discussed this pt with Inna Willis NP  She requested a CTA of the chest    CTA Neg for PE  Pt will be admitted as he is high risk by testing COVID +   Have discussed this pt with Dr. Howard and Rosie Willis NP                       Clinical Impression:   Final diagnoses:  [R07.9] Chest pain  [R05.9] Cough  [R06.02] SOB (shortness of breath) (Primary)  [U07.1] COVID          ED Disposition Condition    Admit               Wendie Duarte NP  01/09/22 3032

## 2022-01-11 NOTE — DISCHARGE SUMMARY
Pending sale to [facility]  Discharge Summary      Patient Name: Kraig Tierney  MRN: [id number]  Patient Class: OP- Observation  Admission Date: 1/9/2022  Hospital Length of Stay: 0 days  Discharge Date and Time:  01/10/2022 8:00 PM  Attending Physician: Sonali att. providers found   Discharging Provider: David Butler MD  Primary Care Provider: Primary Doctor No        Hospital Course:   66-year-old morbidly obese gentleman without significant past medical history sent emergency room after he was found to have COVID-19 infection.  Fortunately patient did not have any infiltrates on CT scan, he remained afebrile, today upon my assessment he was breathing comfortably on room air, even upon ambulation his oxygen saturation did not drop below 92%.  I explained to patient that in his current clinical status he does not qualify for steroids or other inpatient treatment however he should follow-up with his PCP who can consider outpatient monoclonal antibody infusion.  He was discharged home in hemodynamically stable condition.  He was advised to by pulse oximetry and monitor his SpO2 at home.  He was advised to come back to emergency room if he has SpO2 drops consistently below 92% or if his symptoms worsen.     Instructions provided to follow up with primary care physician as outpatient. Patient verbalized understanding and is aware to contact primary care physician or return to ED if new or worsening symptoms.    Physical exam on the day of discharge:  General: Patient resting comfortably in no acute distress.  Morbidly obese  Lungs: CTA. Good air entry.  Cor: Regular rate and rhythm. No murmurs. No pedal edema.  Abd: Soft. Nontender. Non-distended.  Neuro: A&O x3. Moving all 4 extremities equally  Ext: No clubbing. No cyanosis.      Vital signs reviewed.  Nursing notes reviewed       Goals of Care Treatment Preferences:  Code Status: Full Code      Final Active Diagnoses:    Diagnosis Date Noted  POA    PRINCIPAL PROBLEM:  COVID [U07.1] 01/09/2022 Yes    SOB (shortness of breath) [R06.02] 01/09/2022 Yes    Cough [R05.9] 01/09/2022 Yes      Problems Resolved During this Admission:       Discharged Condition: good    Disposition: Home or Self Care    Follow Up:   Follow-up Information     PCP In 1 week.                     Patient Instructions:      Diet Cardiac     Notify your health care provider if you experience any of the following:  temperature >100.4     Notify your health care provider if you experience any of the following:  difficulty breathing or increased cough     Activity as tolerated       Significant Diagnostic Studies: Labs:   BMP:   Recent Labs   Lab 01/09/22  1624 01/10/22  0430   GLU 96 126*   * 133*   K 4.0 4.0   CL 96 99   CO2 28 23   BUN 17 15   CREATININE 1.4 1.2   CALCIUM 8.6* 7.4*   MG  --  1.7       Pending Diagnostic Studies:     None         Medications:  Reconciled Home Medications:      Medication List      CONTINUE taking these medications    COLD AND FLU RELIEF(DIPHEN-PE) ORAL  Take 2 capsules by mouth as needed.     TYLENOL EXTRA STRENGTH 500 MG tablet  Generic drug: acetaminophen  Take 1,000 mg by mouth every 6 (six) hours as needed for Pain.            Indwelling Lines/Drains at time of discharge:   Lines/Drains/Airways     None                 Time spent on the discharge of patient: 34 minutes         David Butler MD  Department of Hospital Medicine  [facility] - Emergency Dept

## 2022-01-11 NOTE — HOSPITAL COURSE
66-year-old morbidly obese gentleman without significant past medical history sent emergency room after he was found to have COVID-19 infection.  Fortunately patient did not have any infiltrates on CT scan, he remained afebrile, today upon my assessment he was breathing comfortably on room air, even upon ambulation his oxygen saturation did not drop below 92%.  I explained to patient that in his current clinical status he does not qualify for steroids or other inpatient treatment however he should follow-up with his PCP who can consider outpatient monoclonal antibody infusion.  He was discharged home in hemodynamically stable condition.  He was advised to by pulse oximetry and monitor his SpO2 at home.  He was advised to come back to emergency room if he has SpO2 drops consistently below 92% or if his symptoms worsen.     Instructions provided to follow up with primary care physician as outpatient. Patient verbalized understanding and is aware to contact primary care physician or return to ED if new or worsening symptoms.    Physical exam on the day of discharge:  General: Patient resting comfortably in no acute distress.  Morbidly obese  Lungs: CTA. Good air entry.  Cor: Regular rate and rhythm. No murmurs. No pedal edema.  Abd: Soft. Nontender. Non-distended.  Neuro: A&O x3. Moving all 4 extremities equally  Ext: No clubbing. No cyanosis.      Vital signs reviewed.  Nursing notes reviewed

## 2022-01-12 DIAGNOSIS — U07.1 COVID-19 VIRUS DETECTED: ICD-10-CM

## 2022-01-14 LAB
BACTERIA BLD CULT: NORMAL
BACTERIA BLD CULT: NORMAL

## 2022-03-21 ENCOUNTER — TELEPHONE (OUTPATIENT)
Dept: PULMONOLOGY | Facility: CLINIC | Age: 67
End: 2022-03-21
Payer: MEDICARE